# Patient Record
Sex: MALE | Race: WHITE | NOT HISPANIC OR LATINO | URBAN - METROPOLITAN AREA
[De-identification: names, ages, dates, MRNs, and addresses within clinical notes are randomized per-mention and may not be internally consistent; named-entity substitution may affect disease eponyms.]

---

## 2019-11-05 ENCOUNTER — INPATIENT (INPATIENT)
Facility: HOSPITAL | Age: 59
LOS: 1 days | Discharge: HOME | DRG: 728 | End: 2019-11-07
Attending: INTERNAL MEDICINE | Admitting: INTERNAL MEDICINE
Payer: COMMERCIAL

## 2019-11-05 VITALS — WEIGHT: 199.96 LBS

## 2019-11-05 DIAGNOSIS — N45.1 EPIDIDYMITIS: ICD-10-CM

## 2019-11-05 LAB
ALBUMIN SERPL ELPH-MCNC: 3.7 G/DL — SIGNIFICANT CHANGE UP (ref 3.3–5)
ALP SERPL-CCNC: 68 U/L — SIGNIFICANT CHANGE UP (ref 40–120)
ALT FLD-CCNC: 16 U/L — SIGNIFICANT CHANGE UP (ref 12–78)
ANION GAP SERPL CALC-SCNC: 2 MMOL/L — LOW (ref 5–17)
APPEARANCE UR: CLEAR — SIGNIFICANT CHANGE UP
APTT BLD: 30 SEC — SIGNIFICANT CHANGE UP (ref 27.5–36.3)
AST SERPL-CCNC: 13 U/L — LOW (ref 15–37)
BASOPHILS # BLD AUTO: 0.04 K/UL — SIGNIFICANT CHANGE UP (ref 0–0.2)
BASOPHILS NFR BLD AUTO: 0.5 % — SIGNIFICANT CHANGE UP (ref 0–2)
BILIRUB SERPL-MCNC: 0.9 MG/DL — SIGNIFICANT CHANGE UP (ref 0.2–1.2)
BILIRUB UR-MCNC: NEGATIVE — SIGNIFICANT CHANGE UP
BUN SERPL-MCNC: 16 MG/DL — SIGNIFICANT CHANGE UP (ref 7–23)
CALCIUM SERPL-MCNC: 8.7 MG/DL — SIGNIFICANT CHANGE UP (ref 8.5–10.1)
CHLORIDE SERPL-SCNC: 108 MMOL/L — SIGNIFICANT CHANGE UP (ref 96–108)
CO2 SERPL-SCNC: 29 MMOL/L — SIGNIFICANT CHANGE UP (ref 22–31)
COLOR SPEC: YELLOW — SIGNIFICANT CHANGE UP
CREAT SERPL-MCNC: 1.02 MG/DL — SIGNIFICANT CHANGE UP (ref 0.5–1.3)
DIFF PNL FLD: NEGATIVE — SIGNIFICANT CHANGE UP
EOSINOPHIL # BLD AUTO: 0.11 K/UL — SIGNIFICANT CHANGE UP (ref 0–0.5)
EOSINOPHIL NFR BLD AUTO: 1.4 % — SIGNIFICANT CHANGE UP (ref 0–6)
GLUCOSE SERPL-MCNC: 77 MG/DL — SIGNIFICANT CHANGE UP (ref 70–99)
GLUCOSE UR QL: NEGATIVE MG/DL — SIGNIFICANT CHANGE UP
HCT VFR BLD CALC: 42.3 % — SIGNIFICANT CHANGE UP (ref 39–50)
HGB BLD-MCNC: 14.9 G/DL — SIGNIFICANT CHANGE UP (ref 13–17)
IMM GRANULOCYTES NFR BLD AUTO: 0.4 % — SIGNIFICANT CHANGE UP (ref 0–1.5)
INR BLD: 1.05 RATIO — SIGNIFICANT CHANGE UP (ref 0.88–1.16)
KETONES UR-MCNC: NEGATIVE — SIGNIFICANT CHANGE UP
LEUKOCYTE ESTERASE UR-ACNC: NEGATIVE — SIGNIFICANT CHANGE UP
LIDOCAIN IGE QN: 71 U/L — LOW (ref 73–393)
LYMPHOCYTES # BLD AUTO: 1.5 K/UL — SIGNIFICANT CHANGE UP (ref 1–3.3)
LYMPHOCYTES # BLD AUTO: 18.5 % — SIGNIFICANT CHANGE UP (ref 13–44)
MCHC RBC-ENTMCNC: 31.9 PG — SIGNIFICANT CHANGE UP (ref 27–34)
MCHC RBC-ENTMCNC: 35.2 GM/DL — SIGNIFICANT CHANGE UP (ref 32–36)
MCV RBC AUTO: 90.6 FL — SIGNIFICANT CHANGE UP (ref 80–100)
MONOCYTES # BLD AUTO: 0.68 K/UL — SIGNIFICANT CHANGE UP (ref 0–0.9)
MONOCYTES NFR BLD AUTO: 8.4 % — SIGNIFICANT CHANGE UP (ref 2–14)
NEUTROPHILS # BLD AUTO: 5.77 K/UL — SIGNIFICANT CHANGE UP (ref 1.8–7.4)
NEUTROPHILS NFR BLD AUTO: 70.8 % — SIGNIFICANT CHANGE UP (ref 43–77)
NITRITE UR-MCNC: NEGATIVE — SIGNIFICANT CHANGE UP
PH UR: 5 — SIGNIFICANT CHANGE UP (ref 5–8)
PLATELET # BLD AUTO: 218 K/UL — SIGNIFICANT CHANGE UP (ref 150–400)
POTASSIUM SERPL-MCNC: 4.1 MMOL/L — SIGNIFICANT CHANGE UP (ref 3.5–5.3)
POTASSIUM SERPL-SCNC: 4.1 MMOL/L — SIGNIFICANT CHANGE UP (ref 3.5–5.3)
PROT SERPL-MCNC: 6.9 GM/DL — SIGNIFICANT CHANGE UP (ref 6–8.3)
PROT UR-MCNC: NEGATIVE MG/DL — SIGNIFICANT CHANGE UP
PROTHROM AB SERPL-ACNC: 11.7 SEC — SIGNIFICANT CHANGE UP (ref 10–12.9)
RBC # BLD: 4.67 M/UL — SIGNIFICANT CHANGE UP (ref 4.2–5.8)
RBC # FLD: 12.8 % — SIGNIFICANT CHANGE UP (ref 10.3–14.5)
SODIUM SERPL-SCNC: 139 MMOL/L — SIGNIFICANT CHANGE UP (ref 135–145)
SP GR SPEC: 1.01 — SIGNIFICANT CHANGE UP (ref 1.01–1.02)
UROBILINOGEN FLD QL: NEGATIVE MG/DL — SIGNIFICANT CHANGE UP
WBC # BLD: 8.13 K/UL — SIGNIFICANT CHANGE UP (ref 3.8–10.5)
WBC # FLD AUTO: 8.13 K/UL — SIGNIFICANT CHANGE UP (ref 3.8–10.5)

## 2019-11-05 PROCEDURE — 84100 ASSAY OF PHOSPHORUS: CPT

## 2019-11-05 PROCEDURE — 80053 COMPREHEN METABOLIC PANEL: CPT

## 2019-11-05 PROCEDURE — 86803 HEPATITIS C AB TEST: CPT

## 2019-11-05 PROCEDURE — 85025 COMPLETE CBC W/AUTO DIFF WBC: CPT

## 2019-11-05 PROCEDURE — 76870 US EXAM SCROTUM: CPT | Mod: 26

## 2019-11-05 PROCEDURE — 83735 ASSAY OF MAGNESIUM: CPT

## 2019-11-05 PROCEDURE — 74177 CT ABD & PELVIS W/CONTRAST: CPT | Mod: 26

## 2019-11-05 PROCEDURE — 36415 COLL VENOUS BLD VENIPUNCTURE: CPT

## 2019-11-05 PROCEDURE — A9579: CPT

## 2019-11-05 PROCEDURE — 72197 MRI PELVIS W/O & W/DYE: CPT

## 2019-11-05 RX ORDER — CIPROFLOXACIN LACTATE 400MG/40ML
1 VIAL (ML) INTRAVENOUS
Qty: 7 | Refills: 0
Start: 2019-11-05 | End: 2019-11-11

## 2019-11-05 RX ORDER — CIPROFLOXACIN LACTATE 400MG/40ML
1 VIAL (ML) INTRAVENOUS
Qty: 10 | Refills: 0
Start: 2019-11-05 | End: 2019-11-14

## 2019-11-05 RX ORDER — MAGNESIUM HYDROXIDE 400 MG/1
30 TABLET, CHEWABLE ORAL DAILY
Refills: 0 | Status: DISCONTINUED | OUTPATIENT
Start: 2019-11-05 | End: 2019-11-07

## 2019-11-05 RX ORDER — CEFTRIAXONE 500 MG/1
1 INJECTION, POWDER, FOR SOLUTION INTRAMUSCULAR; INTRAVENOUS EVERY 24 HOURS
Refills: 0 | Status: DISCONTINUED | OUTPATIENT
Start: 2019-11-05 | End: 2019-11-06

## 2019-11-05 RX ORDER — CHONDROITIN SULFATE A SODIUM 100 %
0 POWDER (GRAM) MISCELLANEOUS
Qty: 0 | Refills: 0 | DISCHARGE

## 2019-11-05 RX ORDER — DICLOFENAC SODIUM 75 MG/1
1 TABLET, DELAYED RELEASE ORAL
Qty: 0 | Refills: 0 | DISCHARGE

## 2019-11-05 RX ORDER — SODIUM CHLORIDE 9 MG/ML
1000 INJECTION INTRAMUSCULAR; INTRAVENOUS; SUBCUTANEOUS ONCE
Refills: 0 | Status: COMPLETED | OUTPATIENT
Start: 2019-11-05 | End: 2019-11-05

## 2019-11-05 RX ORDER — KETOROLAC TROMETHAMINE 30 MG/ML
15 SYRINGE (ML) INJECTION ONCE
Refills: 0 | Status: DISCONTINUED | OUTPATIENT
Start: 2019-11-05 | End: 2019-11-05

## 2019-11-05 RX ORDER — SODIUM CHLORIDE 9 MG/ML
1000 INJECTION INTRAMUSCULAR; INTRAVENOUS; SUBCUTANEOUS
Refills: 0 | Status: DISCONTINUED | OUTPATIENT
Start: 2019-11-05 | End: 2019-11-06

## 2019-11-05 RX ORDER — MORPHINE SULFATE 50 MG/1
2 CAPSULE, EXTENDED RELEASE ORAL EVERY 6 HOURS
Refills: 0 | Status: DISCONTINUED | OUTPATIENT
Start: 2019-11-05 | End: 2019-11-07

## 2019-11-05 RX ORDER — KETOROLAC TROMETHAMINE 30 MG/ML
30 SYRINGE (ML) INJECTION ONCE
Refills: 0 | Status: DISCONTINUED | OUTPATIENT
Start: 2019-11-05 | End: 2019-11-05

## 2019-11-05 RX ADMIN — Medication 30 MILLIGRAM(S): at 14:56

## 2019-11-05 RX ADMIN — SODIUM CHLORIDE 100 MILLILITER(S): 9 INJECTION INTRAMUSCULAR; INTRAVENOUS; SUBCUTANEOUS at 21:40

## 2019-11-05 RX ADMIN — CEFTRIAXONE 1 MILLIGRAM(S): 500 INJECTION, POWDER, FOR SOLUTION INTRAMUSCULAR; INTRAVENOUS at 21:44

## 2019-11-05 RX ADMIN — Medication 30 MILLIGRAM(S): at 12:29

## 2019-11-05 RX ADMIN — SODIUM CHLORIDE 1000 MILLILITER(S): 9 INJECTION INTRAMUSCULAR; INTRAVENOUS; SUBCUTANEOUS at 11:28

## 2019-11-05 RX ADMIN — SODIUM CHLORIDE 1000 MILLILITER(S): 9 INJECTION INTRAMUSCULAR; INTRAVENOUS; SUBCUTANEOUS at 14:56

## 2019-11-05 NOTE — H&P ADULT - GENITOURINARY MALE
no ulcer/Examination done with HUI Buenrostro chaperoning in room/normal external genitalia/no discharge/no tenderness

## 2019-11-05 NOTE — ED STATDOCS - PATIENT PORTAL LINK FT
You can access the FollowMyHealth Patient Portal offered by [facility] by registering at the following website: [email or web address]. By joining [employer]s FollowMyHealth portal, you will also be able to view your health information using other applications (apps) compatible with our system.

## 2019-11-05 NOTE — ED STATDOCS - CARE PROVIDER_API CALL
Espinoza Damon)  ColonRectal Surgery; Surgery  [address] [place] [address]  Phone: [phone]  Fax: [phone]  Follow Up Time:     Lazaro Feldman)  Urology  [address] [facility], [address]nd [place] [address]  Phone: [phone]  Fax: [phone]  Follow Up Time:

## 2019-11-05 NOTE — PHARMACOTHERAPY INTERVENTION NOTE - COMMENTS
Med rec complete. Checked med hx with DrFirst. Confirmed meds with patient at bedside. Patient states he does not take anything other than diclofenac when needed for his back pain.

## 2019-11-05 NOTE — CONSULT NOTE ADULT - ASSESSMENT
Assessment : Epididymitis    Plan : Consider starting Bactrim. Toradol for pain control. F/u outpatient in 1-2 weeks.  Epididymis doesn't explain patient's ambulation difficulties. Likely musculoskeletal in nature Assessment : Epididymitis    Plan : PO Antibiotics and Toradol.  F/u outpatient in 1-2 weeks.  Epididymis doesn't explain patient's ambulation difficulties. Likely neurological/musculoskeletal in nature consider Sports medicine/Neurology   consult.

## 2019-11-05 NOTE — ED STATDOCS - CLINICAL SUMMARY MEDICAL DECISION MAKING FREE TEXT BOX
Pt with peroneal pain. Will check labs. CT. Pt with peroneal pain. Will check labs. CT. Labs & CT unremarkable. US + for epididymitis. Patient made aware, will follow up with urology and colorectal surgery.

## 2019-11-05 NOTE — ED STATDOCS - SKIN, MLM
skin normal color for race, warm, dry and intact. skin normal color for race, warm, dry and intact. Mild peroneal tenderness. No erythema, No fluctuance.

## 2019-11-05 NOTE — ED STATDOCS - ATTENDING CONTRIBUTION TO CARE
I, Russ Escobedo, performed the initial face to face bedside interview with this patient regarding history of present illness, review of symptoms and relevant past medical, social and family history.  I completed an independent physical examination.  I was the initial provider who evaluated this patient. I have signed out the follow up of any pending tests (i.e. labs, radiological studies) to the ACP.  I have communicated the patient’s plan of care and disposition with the ACP.  The history, relevant review of systems, past medical and surgical history, medical decision making, and physical examination was documented by the scribe in my presence and I attest to the accuracy of the documentation.

## 2019-11-05 NOTE — ED ADULT NURSE REASSESSMENT NOTE - NS ED NURSE REASSESS COMMENT FT1
patient refused morphine, and patient is upset about not getting an MRI done in the hospital. Attempted to talk to patient about the tests that were being performed and controlling his pain and making him comfortable while he was here. Pt stated that "he didn't want the pain to be controlled, he just wants the problem to be solved"

## 2019-11-05 NOTE — H&P ADULT - ASSESSMENT
60 y/o M with PMH of bartonella infection 3 years ago, p/w perineal /groin pain    *Epididimytis  -Ceftriaxone   -F/u cultures    *Perineal / Groin / lower abdominal pain  -MRI of pelvic area and lumbar spine for further evaluation  -Pain control    *2.1cm indeterminate renal lesion  -F/u MRI result  -F/u  recommendations     *DVT ppx  -SCDs 60 y/o M with PMH of bartonella infection 3 years ago, p/w perineal /groin pain    *Epididimytis  -Ceftriaxone   -UA negative     *Perineal / Groin / lower abdominal pain  -MRI of pelvic area and lumbar spine for further evaluation  -Pain control    *2.1cm indeterminate renal lesion  -F/u MRI result  -F/u  recommendations     *DVT ppx  -SCDs

## 2019-11-05 NOTE — CONSULT NOTE ADULT - SUBJECTIVE AND OBJECTIVE BOX
60 yo M presents with complaints of groin pain x 2 months that has been progressively getting worse. His reports his pain is mainly in perineal area. Denies any trauma. States yesterday his pain flared and is currently causing him to have difficulty in ambulating. Pt reports [place] other weekend. Pt denies any fever/chills, hematuria, dysuria, frequency, urgency, flank pain or scrotal pain.   CT     PAST MEDICAL & SURGICAL HISTORY: none      REVIEW OF SYSTEMS:    CONSTITUTIONAL:  fevers or chills  HEENT: No visual changes  ENDO: No sweating  NECK: No pain or stiffness  MUSCULOSKELETAL: No back pain, no joint pain  RESPIRATORY: No shortness of breath  CARDIOVASCULAR: No chest pain  GASTROINTESTINAL: No abdominal pain, nausea or vomiting  : in HPI  NEUROLOGICAL: No mental status changes    Allergies  hydrocortisone (Unknown)    SOCIAL HISTORY: No illicit drug use // smoking  : [ ] yes [ ] no //  : [ ] yes [ ] no // ETOH :  [ ] yes [ ] no    Vital Signs Last 24 Hrs  T(C): 36.8 (2019 14:50), Max: 36.8 (2019 14:50)  T(F): 98.3 (2019 14:50), Max: 98.3 (2019 14:50)  HR: 69 (2019 14:50) (69 - 70)  BP: 120/72 (2019 14:50) (112/76 - 120/72)  BP(mean): --  RR: 18 (2019 14:50) (18 - 18)  SpO2: 97% (2019 14:50) (97% - 100%)   [ ] yes [ ] no  PHYSICAL EXAM:    Constitutional: NAD, well-developed  Neck: no pain  Respiratory:  No accessory respiratory muscle use  CV: Chest symmetric, equal expansion  Back: No CVA tenderness  Abd: Soft, NT/ND  no hernia appreciated  : no scrotal swelling. No testicular tenderness. No tenderness of epididymis   BLANCA: prostate not tender to palpation. smooth in texture  Extremities: no edema  Neurological: A/O x 3  Psychiatric: Normal mood, normal affect  Skin: No rashes    LABS:                        14.9   8.13  )-----------( 218      ( 2019 11:00 )             42.3     11-    139  |  108  |  16  ----------------------------<  77  4.1   |  29  |  1.02    Ca    8.7      2019 11:00    TPro  6.9  /  Alb  3.7  /  TBili  0.9  /  DBili  x   /  AST  13<L>  /  ALT  16  /  AlkPhos  68  11-05    PT/INR - ( 2019 11:00 )   PT: 11.7 sec;   INR: 1.05 ratio       PTT - ( 2019 11:00 )  PTT:30.0 sec  Urinalysis Basic - ( 2019 11:24 )    Color: Yellow / Appearance: Clear / S.010 / pH: x  Gluc: x / Ketone: Negative  / Bili: Negative / Urobili: Negative mg/dL   Blood: x / Protein: Negative mg/dL / Nitrite: Negative   Leuk Esterase: Negative / RBC: x / WBC x   Sq Epi: x / Non Sq Epi: x / Bacteria    RADIOLOGY & ADDITIONAL STUDIES:\    CT: No evidence of perianal or perineal inflammation or abscess.    Scrotal ultrasound: Findings are suggestive of epididymitis in the left scrotum. A cyst in   the left epididymal head.  The testes are unremarkable. Small bilateral hydroceles. 60 yo M presents with complaints of groin pain x 2 months that has been progressively getting worse. His reports his pain is mainly in perineal area. Denies any trauma. States yesterday his pain flared and is currently causing him to have difficulty in ambulating. Pt reports [place] other weekend. Pt denies any fever/chills, hematuria, dysuria, frequency, urgency, flank pain or scrotal pain.       PAST MEDICAL & SURGICAL HISTORY: none      REVIEW OF SYSTEMS:    CONSTITUTIONAL:  fevers or chills  HEENT: No visual changes  ENDO: No sweating  NECK: No pain or stiffness  MUSCULOSKELETAL: No back pain, no joint pain  RESPIRATORY: No shortness of breath  CARDIOVASCULAR: No chest pain  GASTROINTESTINAL: No abdominal pain, nausea or vomiting  : in HPI  NEUROLOGICAL: No mental status changes    Allergies  hydrocortisone (Unknown)    SOCIAL HISTORY: No illicit drug use // smoking  : [ ] yes [ ] no //  : [ ] yes [ ] no // ETOH :  [ ] yes [ ] no    Vital Signs Last 24 Hrs  T(C): 36.8 (2019 14:50), Max: 36.8 (2019 14:50)  T(F): 98.3 (2019 14:50), Max: 98.3 (2019 14:50)  HR: 69 (2019 14:50) (69 - 70)  BP: 120/72 (2019 14:50) (112/76 - 120/72)  BP(mean): --  RR: 18 (2019 14:50) (18 - 18)  SpO2: 97% (2019 14:50) (97% - 100%)   [ ] yes [ ] no  PHYSICAL EXAM:    Constitutional: NAD, well-developed  Neck: no pain  Respiratory:  No accessory respiratory muscle use  CV: Chest symmetric, equal expansion  Back: No CVA tenderness  Abd: Soft, NT/ND  no hernia appreciated  : no scrotal swelling. No testicular tenderness. No tenderness of epididymis   BLANCA: Prostate- 30g, non tender, no nodules  Extremities: no edema  Neurological: A/O x 3  Psychiatric: Normal mood, normal affect  Skin: No rashes    LABS:                        14.9   8.13  )-----------( 218      ( 2019 11:00 )             42.3     11-    139  |  108  |  16  ----------------------------<  77  4.1   |  29  |  1.02    Ca    8.7      2019 11:00    TPro  6.9  /  Alb  3.7  /  TBili  0.9  /  DBili  x   /  AST  13<L>  /  ALT  16  /  AlkPhos  68  11-05    PT/INR - ( 2019 11:00 )   PT: 11.7 sec;   INR: 1.05 ratio       PTT - ( 2019 11:00 )  PTT:30.0 sec  Urinalysis Basic - ( 2019 11:24 )    Color: Yellow / Appearance: Clear / S.010 / pH: x  Gluc: x / Ketone: Negative  / Bili: Negative / Urobili: Negative mg/dL   Blood: x / Protein: Negative mg/dL / Nitrite: Negative   Leuk Esterase: Negative / RBC: x / WBC x   Sq Epi: x / Non Sq Epi: x / Bacteria    RADIOLOGY & ADDITIONAL STUDIES:\    CT: No evidence of perianal or perineal inflammation or abscess.    Scrotal ultrasound: Findings are suggestive of epididymitis in the left scrotum. A cyst in   the left epididymal head.  The testes are unremarkable. Small bilateral hydroceles.

## 2019-11-05 NOTE — ED STATDOCS - NSFOLLOWUPINSTRUCTIONS_ED_ALL_ED_FT
Epididymitis    WHAT YOU NEED TO KNOW:    What is epididymitis? Epididymitis is inflammation of your epididymis. The epididymis is a coiled tube inside your scrotum. It stores and carries sperm from your testicles to your penis. Acute epididymitis lasts for 6 weeks or less. Chronic epididymitis lasts longer than 6 weeks.Male Reproductive System         What causes epididymitis? The cause of epididymitis may be unknown. It may be caused by any of the following:     A urinary tract infection (UTI) that spreads to the epididymis       Urine that flows backward from your urethra to the epididymitis      Use of heart medicine called amiodarone      Sexually transmitted infections (STIs) such as gonorrhea or Chlamydia    What increases my risk of epididymitis?     Urinary tract conditions that cause frequent UTIs      Having an indwelling urinary catheter (thin, flexible tube inserted into the bladder and left in place to drain urine)      Recent surgery of the urinary tract      Physical strain that puts pressure on the abdomen, such as heavy lifting      Prostate disorders such as benign prostatic hypertrophy or prostatitis    What are the signs and symptoms of epididymitis?     Pain or tenderness in your scrotum, abdomen, or groin      Redness or swelling of your scrotum      Pain or burning during urination, or frequent urination      Discharge from your penis or blood in your urine or semen      Fever    How is epididymitis diagnosed? Your healthcare provider may examine your penis, prostate, and scrotum. He may ask about your symptoms and any health conditions you have. You may need any of the following tests:     Blood and urine tests may be done to see if you have an infection. If you have discharge, a small amount of this fluid will be tested for bacteria.       An ultrasound uses sound waves to show pictures of your testicles on a monitor. An ultrasound may be used to check blood flow to your testicles.       A nuclear scan checks the blood flow in your testicles. A small amount of radioactive material may be injected into your blood. The radioactive material helps your blood vessels show up better.    How is epididymitis treated? Your treatment depends on the cause of your epididymitis and may include any of the following:     Antibiotics may be given if epididymitis is caused by a bacterial infection.       NSAIDs, such as ibuprofen, help decrease swelling, pain, and fever. This medicine is available with or without a doctor's order. NSAIDs can cause stomach bleeding or kidney problems in certain people. If you take blood thinner medicine, always ask if NSAIDs are safe for you. Always read the medicine label and follow directions. Do not give these medicines to children under 6 months of age without direction from your child's healthcare provider.      Acetaminophen decreases pain and fever. It is available without a doctor's order. Ask how much to take and how often to take it. Follow directions. Acetaminophen can cause liver damage if not taken correctly.      Prescription pain medicine may be given. Ask how to take this medicine safely.      Surgery may be needed if your condition gets worse or becomes chronic. Surgery to drain an abscess (collection of pus) may be needed. Surgery to remove part or all of your epididymis or testicle may also be done.     How can I manage epididymitis?     Apply ice on your testicles for 15 to 20 minutes every hour or as directed. Use an ice pack, or put crushed ice in a plastic bag. Cover it with a towel. Ice helps prevent tissue damage and decreases swelling and pain.      Rest in bed as directed. Elevate your scrotum when you sit or lie down to help reduce swelling and pain. You may be asked to do this by placing a rolled-up towel under your scrotum.      Scrotal support may be recommended. An athletic supporter provides scrotal support and may make you more comfortable when you stand. Ask your healthcare provider how to use an athletic supporter.       Do not lift heavy objects. You can make swelling worse if you lift heavy objects or strain.     When should I seek immediate care?     You have severe pain in your testicles.      Your symptoms become worse even after you start treatment with medicine.    When should I contact my healthcare provider?     Your symptoms do not get better within 3 days of treatment or come back after treatment.      You have a hot, red, tender area on your testicles.      You have questions or concerns about your condition or care.    CARE AGREEMENT:    You have the right to help plan your care. Learn about your health condition and how it may be treated. Discuss treatment options with your healthcare providers to decide what care you want to receive. You always have the right to refuse treatment.        © Copyright [employer] 2019 All illustrations and images included in CareNotes are the copyrighted property of [email or web address] or [employer].      back to top                      © Copyright [employer] 2019

## 2019-11-05 NOTE — ED STATDOCS - CARE PROVIDERS DIRECT ADDRESSES
[email or web address]

## 2019-11-05 NOTE — ED STATDOCS - CARE PLAN
Principal Discharge DX:	Epididymitis Principal Discharge DX:	Epididymitis  Secondary Diagnosis:	Intractable pain  Secondary Diagnosis:	Ambulatory dysfunction

## 2019-11-05 NOTE — ED ADULT TRIAGE NOTE - CHIEF COMPLAINT QUOTE
pt c/o groin pain but unable to specify if the left or right  groin hurts. pt states there is no buldge and the o pain is in the center, causing him to not be able to  walk or eat. pt states he has a strangulated hernia - pt states this was not confirmed by a doctor, pt denies fever and urianry symptoms

## 2019-11-05 NOTE — ED ADULT NURSE REASSESSMENT NOTE - NS ED NURSE REASSESS COMMENT FT1
patient wants to leave [facility], spoke to md rachel, orders for MRI will be placed per virginie, patient decided to stay in hospital, report given to 5E

## 2019-11-05 NOTE — ED STATDOCS - OBJECTIVE STATEMENT
58 y/o male with a PMHx of hemorrhoids presents to the ED for evaluation. Pt notes he has a hernia that has not been confirmed by a doctor. Pt states there is no bulge in area. Pt c/o worsening pain. Last BM yesterday. No other complaints at this time. 58 y/o male with a PMHx of hemorrhoids presents to the ED for evaluation. Pt reports a hernia between his rectum and scrotum. It has not been confirmed by a provider, but this diagnosis is based on the patient's research. Pt states there is no bulge in area. Pt c/o worsening pain. Last BM yesterday. No other complaints at this time. he saw a doctor in [place] recently.

## 2019-11-05 NOTE — ED STATDOCS - PROGRESS NOTE DETAILS
58 y/o M with PMH of hemorrhoids presents with perineal/groin pain. Pt believes he has a hernia. Denies fever, chills, dysuria, difficulty with bowel movement. Pt states pain is so severe he has difficulty walking. PE: Uncomfortable appearing. Cardiac: s1s2, RRR> Lungs: CTAB. Abdomen: NBS x4, soft, nontender. No CVAT. : Performed with Dr. Escobedo. No significant erythema, edema in perineal area. No palpable mass/tenderness to palpation. A/P: Perineal pain, plan for analgesia, labs, CTAP, reassess. - Yury Isaac PA-C Had at length discussion with patient. Labs and CT unremarkable. Pt reported persistent pain, concerned CT scan not functioning properly in absence of lack of findings. At that time noted pain radiated into testicles. Patient sent for US, findings consistent with epididymitis. Patient made aware. Does not believe epididymitis is cause of pain. Patient made aware that lack of findings in ED does not correlate with lack of pathology. Patient advised to pursue further follow up with urology and colorectal surgery. Patient is agreeable to plan, will d/c at this time. Offered percocet at home for pain control, patient refused. Advised ibuprofen until follow up pursued. - Yury Isaac PA-C Patient refusing to leave following discharge, demanding urology consult. Will call urology to discuss. Pt reports persistent pain with walking. - Yury Isaac PA-C Dr. Brizuela to see patient in ED. - Yury Isaac PA-C Patient evaluated by urology, agreed with PO antibiotics and analgesia for epididymitis. Does not feel pain patient reports if from urologic source. States patient is demanding sports medicine consult and neurology consult. - Yury Isaac PA-C Patient re-evaluated, states he still is unable to walk 2/2 pain. Offered percocet, refused. Patient offered admission for further evaluation including consultation and imaging. Patient is agreeable. Plan for admission. - Yury Isaac PA-C

## 2019-11-06 LAB
ALBUMIN SERPL ELPH-MCNC: 3.4 G/DL — SIGNIFICANT CHANGE UP (ref 3.3–5)
ALP SERPL-CCNC: 58 U/L — SIGNIFICANT CHANGE UP (ref 40–120)
ALT FLD-CCNC: 15 U/L — SIGNIFICANT CHANGE UP (ref 12–78)
ANION GAP SERPL CALC-SCNC: 6 MMOL/L — SIGNIFICANT CHANGE UP (ref 5–17)
AST SERPL-CCNC: 11 U/L — LOW (ref 15–37)
BASOPHILS # BLD AUTO: 0.03 K/UL — SIGNIFICANT CHANGE UP (ref 0–0.2)
BASOPHILS NFR BLD AUTO: 0.5 % — SIGNIFICANT CHANGE UP (ref 0–2)
BILIRUB SERPL-MCNC: 0.6 MG/DL — SIGNIFICANT CHANGE UP (ref 0.2–1.2)
BUN SERPL-MCNC: 16 MG/DL — SIGNIFICANT CHANGE UP (ref 7–23)
CALCIUM SERPL-MCNC: 8.2 MG/DL — LOW (ref 8.5–10.1)
CHLORIDE SERPL-SCNC: 109 MMOL/L — HIGH (ref 96–108)
CO2 SERPL-SCNC: 26 MMOL/L — SIGNIFICANT CHANGE UP (ref 22–31)
CREAT SERPL-MCNC: 0.95 MG/DL — SIGNIFICANT CHANGE UP (ref 0.5–1.3)
CULTURE RESULTS: NO GROWTH — SIGNIFICANT CHANGE UP
EOSINOPHIL # BLD AUTO: 0.06 K/UL — SIGNIFICANT CHANGE UP (ref 0–0.5)
EOSINOPHIL NFR BLD AUTO: 0.9 % — SIGNIFICANT CHANGE UP (ref 0–6)
GLUCOSE SERPL-MCNC: 87 MG/DL — SIGNIFICANT CHANGE UP (ref 70–99)
HCT VFR BLD CALC: 41.6 % — SIGNIFICANT CHANGE UP (ref 39–50)
HCV AB S/CO SERPL IA: 0.12 S/CO — SIGNIFICANT CHANGE UP (ref 0–0.99)
HCV AB SERPL-IMP: SIGNIFICANT CHANGE UP
HGB BLD-MCNC: 14 G/DL — SIGNIFICANT CHANGE UP (ref 13–17)
IMM GRANULOCYTES NFR BLD AUTO: 0.3 % — SIGNIFICANT CHANGE UP (ref 0–1.5)
LYMPHOCYTES # BLD AUTO: 1.35 K/UL — SIGNIFICANT CHANGE UP (ref 1–3.3)
LYMPHOCYTES # BLD AUTO: 20.5 % — SIGNIFICANT CHANGE UP (ref 13–44)
MAGNESIUM SERPL-MCNC: 2 MG/DL — SIGNIFICANT CHANGE UP (ref 1.6–2.6)
MCHC RBC-ENTMCNC: 30.8 PG — SIGNIFICANT CHANGE UP (ref 27–34)
MCHC RBC-ENTMCNC: 33.7 GM/DL — SIGNIFICANT CHANGE UP (ref 32–36)
MCV RBC AUTO: 91.6 FL — SIGNIFICANT CHANGE UP (ref 80–100)
MONOCYTES # BLD AUTO: 0.5 K/UL — SIGNIFICANT CHANGE UP (ref 0–0.9)
MONOCYTES NFR BLD AUTO: 7.6 % — SIGNIFICANT CHANGE UP (ref 2–14)
NEUTROPHILS # BLD AUTO: 4.61 K/UL — SIGNIFICANT CHANGE UP (ref 1.8–7.4)
NEUTROPHILS NFR BLD AUTO: 70.2 % — SIGNIFICANT CHANGE UP (ref 43–77)
PHOSPHATE SERPL-MCNC: 2.6 MG/DL — SIGNIFICANT CHANGE UP (ref 2.5–4.5)
PLATELET # BLD AUTO: 218 K/UL — SIGNIFICANT CHANGE UP (ref 150–400)
POTASSIUM SERPL-MCNC: 4.2 MMOL/L — SIGNIFICANT CHANGE UP (ref 3.5–5.3)
POTASSIUM SERPL-SCNC: 4.2 MMOL/L — SIGNIFICANT CHANGE UP (ref 3.5–5.3)
PROT SERPL-MCNC: 6.4 GM/DL — SIGNIFICANT CHANGE UP (ref 6–8.3)
RBC # BLD: 4.54 M/UL — SIGNIFICANT CHANGE UP (ref 4.2–5.8)
RBC # FLD: 12.6 % — SIGNIFICANT CHANGE UP (ref 10.3–14.5)
SODIUM SERPL-SCNC: 141 MMOL/L — SIGNIFICANT CHANGE UP (ref 135–145)
SPECIMEN SOURCE: SIGNIFICANT CHANGE UP
WBC # BLD: 6.57 K/UL — SIGNIFICANT CHANGE UP (ref 3.8–10.5)
WBC # FLD AUTO: 6.57 K/UL — SIGNIFICANT CHANGE UP (ref 3.8–10.5)

## 2019-11-06 PROCEDURE — 72197 MRI PELVIS W/O & W/DYE: CPT | Mod: 26

## 2019-11-06 RX ORDER — CEFTRIAXONE 500 MG/1
1000 INJECTION, POWDER, FOR SOLUTION INTRAMUSCULAR; INTRAVENOUS EVERY 24 HOURS
Refills: 0 | Status: DISCONTINUED | OUTPATIENT
Start: 2019-11-06 | End: 2019-11-07

## 2019-11-06 RX ADMIN — CEFTRIAXONE 1000 MILLIGRAM(S): 500 INJECTION, POWDER, FOR SOLUTION INTRAMUSCULAR; INTRAVENOUS at 22:17

## 2019-11-06 RX ADMIN — SODIUM CHLORIDE 100 MILLILITER(S): 9 INJECTION INTRAMUSCULAR; INTRAVENOUS; SUBCUTANEOUS at 06:15

## 2019-11-06 RX ADMIN — MAGNESIUM HYDROXIDE 30 MILLILITER(S): 400 TABLET, CHEWABLE ORAL at 09:00

## 2019-11-06 NOTE — PROGRESS NOTE ADULT - SUBJECTIVE AND OBJECTIVE BOX
60 y/o M with PMH of bartonella infection 3 years ago, p/w groin pain. Patient states he has had groin pain for about two months, but is mainly pointing at the perineum area. Also states he has some pain in inguinal areas and lower abdomen. Yesterday afternoon pain became so severe that patient was not able to walk, he had to crawl on the floor inside his house. He wasn't even able to make it to bed, he ended up sleeping on the couch. Normally he is a very active person, and now he is barely able to do anything. Patient states multiple times that pain is not coming from testicles, and does not believe that this could any way be referred pain from epididymitis seen on U/S. Patient is demanding further evaluation with MRI, as he states he is in severe pain that hasn't been explained with CT scan or U/S, and it is causing difficulty with ambulation. Patient denies fever, chills, dysuria, nausea, vomiting, CP, cough, runny nose, sore throat. Patient denies any weakness in the lower extremities, and states difficulty walking is purely due to the pain that he is feeling. Denies any sensory deficits / saddle anesthesia. Denies back pain, incontinence.     19: Patient seen and examined. NO new complaints. Waiting for MRI.         Vital Signs Last 24 Hrs  T(C): 37.1 (2019 11:36), Max: 37.1 (2019 11:36)  T(F): 98.8 (2019 11:36), Max: 98.8 (2019 11:36)  HR: 70 (2019 11:36) (70 - 74)  BP: 119/77 (2019 11:36) (115/70 - 140/78)  BP(mean): --  RR: 16 (2019 11:36) (16 - 19)  SpO2: 97% (2019 11:36) (97% - 99%)      Physical Exam:  · Constitutional	Well-developed, well nourished	  · Eyes	EOMI; PERRL; no drainage or redness	  · ENMT	No oral lesions; no gross abnormalities	  · Neck	No bruits; no thyromegaly or nodules	  · Respiratory	Breath Sounds equal & clear to percussion & auscultation, no accessory muscle use	  · Cardiovascular	Regular rate & rhythm, normal S1, S2; no murmurs, gallops or rubs; no S3, S4	  · Gastrointestinal	Soft, non-tender, no hepatosplenomegaly, normal bowel sounds	  · Genitourinary	detailed exam	  · Genitourinary Details Male	normal external genitalia; no discharge; no tenderness; no ulcer; Examination done with HUI Buenrostro chaperoning in room	  · Extremities	No cyanosis, clubbing or edema	  · Neurological	Alert & oriented; no sensory, motor or coordination deficits, normal reflexes	  · Musculoskeletal	No joint pain, swelling or deformity; no limitation of movement	            Labs:                           14.0   6.57  )-----------( 218      ( 2019 06:53 )             41.6     2019 06:53    141    |  109    |  16     ----------------------------<  87     4.2     |  26     |  0.95     Ca    8.2        2019 06:53  Phos  2.6       2019 06:53  Mg     2.0       2019 06:53    TPro  6.4    /  Alb  3.4    /  TBili  0.6    /  DBili  x      /  AST  11     /  ALT  15     /  AlkPhos  58     2019 06:53    LIVER FUNCTIONS - ( 2019 06:53 )  Alb: 3.4 g/dL / Pro: 6.4 gm/dL / ALK PHOS: 58 U/L / ALT: 15 U/L / AST: 11 U/L / GGT: x           PT/INR - ( 2019 11:00 )   PT: 11.7 sec;   INR: 1.05 ratio         PTT - ( 2019 11:00 )  PTT:30.0 sec  CAPILLARY BLOOD GLUCOSE            Urinalysis Basic - ( 2019 11:24 )    Color: Yellow / Appearance: Clear / S.010 / pH: x  Gluc: x / Ketone: Negative  / Bili: Negative / Urobili: Negative mg/dL   Blood: x / Protein: Negative mg/dL / Nitrite: Negative   Leuk Esterase: Negative / RBC: x / WBC x   Sq Epi: x / Non Sq Epi: x / Bacteria: x          MEDICATIONS  (STANDING):  cefTRIAXone Injectable. 1 milliGRAM(s) IV Push every 24 hours    MEDICATIONS  (PRN):  magnesium hydroxide Suspension 30 milliLiter(s) Oral daily PRN Constipation  morphine  - Injectable 2 milliGRAM(s) IV Push every 6 hours PRN Moderate Pain (4 - 6)          Assessment and Plan:   Assessment:  · Assessment		    60 y/o M with PMH of bartonella infection 3 years ago, p/w perineal /groin pain    *Epididymitis   -Continue IV Ceftriaxone   -UA negative  -ID and  eval     *Perineal / Groin / lower abdominal pain  -MRI of pelvic area and lumbar spine for further evaluation-pending  -Pain control    *2.1cm indeterminate renal lesion  -F/u MRI result  -F/u  recommendations     *DVT ppx  -SCDs

## 2019-11-07 VITALS
TEMPERATURE: 98 F | DIASTOLIC BLOOD PRESSURE: 71 MMHG | RESPIRATION RATE: 17 BRPM | HEART RATE: 70 BPM | SYSTOLIC BLOOD PRESSURE: 113 MMHG | OXYGEN SATURATION: 96 %

## 2019-11-07 PROCEDURE — 99222 1ST HOSP IP/OBS MODERATE 55: CPT

## 2019-11-07 NOTE — DISCHARGE NOTE NURSING/CASE MANAGEMENT/SOCIAL WORK - PATIENT PORTAL LINK FT
You can access the FollowMyHealth Patient Portal offered by [facility] by registering at the following website: [email or web address]. By joining [employer]’s FollowMyHealth portal, you will also be able to view your health information using other applications (apps) compatible with our system.

## 2019-11-07 NOTE — CONSULT NOTE ADULT - SUBJECTIVE AND OBJECTIVE BOX
59 year old male who presented to the hospital with severe perineal pain which prevented him from walking. He reports a sharp pain in the perineum with occasional radiation to the lower abdomen. It began 3 days ago and has gradually subsided. It is associated with changes in bowel movements as he did not have once since onset of pain which is different from his baseline of daily bowel movements. This is his fourth episode of this type of pain although it was more severe this time. All episodes have resolved spontaneously. Pain meds tend to help a bit and walking and certain positions make it worse. He denies fevers, chills, rectal bleeding, perirectal drainage or abscess or urinary symptoms. Last colonoscopy was within the past 2 years and he has a history of polyps.     PAST MEDICAL & SURGICAL HISTORY:  Hemorrhoids  Bartonella infection    Home Medications:  chondroitin:  (05 Nov 2019 20:48)  diclofenac sodium 75 mg oral delayed release tablet: 1 tab(s) orally prn (05 Nov 2019 20:48)    FAMILY HISTORY:  colon cancer in father     Social history: , non smoker    Exam:  Vital Signs Last 24 Hrs  T(C): 36.7 (07 Nov 2019 05:14), Max: 36.7 (06 Nov 2019 16:29)  T(F): 98 (07 Nov 2019 05:14), Max: 98 (06 Nov 2019 16:29)  HR: 70 (07 Nov 2019 05:14) (70 - 81)  BP: 113/71 (07 Nov 2019 05:14) (113/71 - 115/76)  RR: 17 (07 Nov 2019 05:14) (17 - 18)  SpO2: 96% (07 Nov 2019 05:14) (96% - 99%)    In no distress  Non labored breathing  Regular rate and rhythm  Abdomen soft, non tender, non distended  Rectum: no perianal inflammation, abscess or evidence of fistula. Non thrombosed external hemorrhoid, normal BLANCA. No mass appreciated  Skin is warm and dry  Moves extremities without difficulty  Alert and oriented x 3                          14.0   6.57  )-----------( 218      ( 06 Nov 2019 06:53 )             41.6   11-06    141  |  109<H>  |  16  ----------------------------<  87  4.2   |  26  |  0.95    Ca    8.2<L>      06 Nov 2019 06:53  Phos  2.6     11-06  Mg     2.0     11-06    TPro  6.4  /  Alb  3.4  /  TBili  0.6  /  DBili  x   /  AST  11<L>  /  ALT  15  /  AlkPhos  58  11-06    < from: MR Pelvis w/wo IV Cont (11.06.19 @ 13:19) >  EXAM:  MR PELVIS WAW IC                            PROCEDURE DATE:  11/06/2019          INTERPRETATION:  MR PELVIS WITHOUT AND WITH IV CONTRAST    CLINICAL INFORMATION: Pelvic and perineal pain     TECHNIQUE: Multiplanar T1-weighted, T2-weighted, and diffusion weighted   sequences were obtained of the pelvis, including dynamic post-contrast   T1-weighted sequences.  Dedicated, small field-of-view, thin-section   imaging is performed for evaluation of the perianal region.    Field Strength: 1.5T      < end of copied text >  < from: MR Pelvis w/wo IV Cont (11.06.19 @ 13:19) >  EXAM:  MR PELVIS WAW IC                          PROCEDURE DATE:  11/06/2019      INTERPRETATION:  MR PELVIS WITHOUT AND WITH IV CONTRAST    CLINICAL INFORMATION: Pelvic and perineal pain     COMPARISON: CT abdomen and pelvis 11/5/2019.    FINDINGS:    PERIANAL REGION: There is a linear enhancing tract arising from the right   posterior anus and terminating along the subcutaneous tissues of the   right posterior perianal region. No definite skin opening, although   correlation with direct visualization is needed. There are no secondary   tracts or abscesses. There is no significant fluid within the tract,   suggesting that this may be an inactive tract in the stages of healing   with granulation tissue formation.    PELVIC VISCERA: Prostate gland is within normal limits for size. Seminal   vesicles are symmetric. Unremarkable urinary bladder.   PELVIC LYMPH NODES: No pelvic adenopathy.   VASCULATURE: Normal caliber.  BOWEL: No abnormality in the visualized pelvic bowel loops.  PERITONEUM/ABDOMINAL WALL: No pelvic ascites.  SKELETAL: No aggressive lesion.     IMPRESSION:    There is a linear enhancing tract arising from the right posterior anus   and terminating along the subcutaneous tissues of the right posterior   perianal region. No definite skin opening, although correlation with   direct visualization is needed. There are no secondary tracts or   abscesses. There is no significant fluidwithin the tract, suggesting   that this may be an inactive tract in the stages of healing with   granulation tissue formation.       < end of copied text >    < from: CT Abdomen and Pelvis w/ IV Cont (11.05.19 @ 11:11) >    EXAM:  CT ABDOMEN AND PELVIS IC                            PROCEDURE DATE:  11/05/2019          INTERPRETATION:  CT ABDOMEN AND PELVIS WITH IV CONTRAST    CLINICAL INFORMATION: perineal pain      COMPARISON: None.    PROCEDURE:   CT of the Abdomen and Pelvis was performed with intravenous contrast.  Intravenous contrast: 90 cc Omnipaque 350  Oral contrast: None.  Sagittal and coronal reformats were performed.    FINDINGS:    LOWER CHEST: Clear.    LIVER: Normal.  BILE DUCTS: Nondilated.  GALLBLADDER: Normal.  SPLEEN: Normal.  PANCREAS: Normal.  ADRENALS: Normal.  KIDNEYS/URETERS: No hydronephrosis or urinary tract calculi. 2.1 cm   indeterminate left lower pole renal lesion.    BLADDER: Normal.  REPRODUCTIVE ORGANS: Unremarkable prostate and seminal vesicles.    BOWEL: No bowel-related abnormality. Specifically, no evidence of acute   diverticulitis. Normal ileocecal region. No bowel obstruction or bowel   inflammation.  PERITONEUM: No free air or ascites.  VESSELS:  Normal caliber aorta.  RETROPERITONEUM/LYMPH NODES: No adenopathy.    ABDOMINAL WALL: Normal.  BONES: No acute bony abnormality.    IMPRESSION:     No evidence of perianal or perineal inflammation or abscess.    2.1 cm indeterminate left lower pole renal lesion. Follow-up MRI abdomen   with and without contrast.    < end of copied text >

## 2019-11-07 NOTE — DISCHARGE NOTE PROVIDER - NSDCMRMEDTOKEN_GEN_ALL_CORE_FT
chondroitin:   diclofenac sodium 75 mg oral delayed release tablet: 1 tab(s) orally prn  levoFLOXacin 500 mg oral tablet: 1 tab(s) orally once a day

## 2019-11-07 NOTE — CONSULT NOTE ADULT - ASSESSMENT
59 year old male with perineal pain which is intermittent and has resolved on its own. Physical exam of the rectal and perineum is normal. Radiology images reviewed and there is no clinical evidence of a fistula. His symptoms are likely musculoskeletal in nature.

## 2019-11-07 NOTE — DISCHARGE NOTE PROVIDER - PROVIDER TOKENS
PROVIDER:[TOKEN:[[id number]],FOLLOWUP:[1 week]],PROVIDER:[TOKEN:[[id number]],FOLLOWUP:[1 week]]

## 2019-11-07 NOTE — DISCHARGE NOTE PROVIDER - CARE PROVIDER_API CALL
Nilam Hardy)  Family Medicine  [address] [place] [address]  Phone: [phone]  Fax: [phone]  Follow Up Time: 1 week    Solitario Horner)  Urology  [address] [facility], [address] [place] [address]  Phone: [phone]  Fax: [phone]  Follow Up Time: 1 week

## 2019-11-07 NOTE — DISCHARGE NOTE PROVIDER - HOSPITAL COURSE
CC: Hoda-anal pain        Subjective and Objective:     58 y/o M with PMH of bartonella infection 3 years ago, p/w groin pain. Patient states he has had groin pain for about two months, but is mainly pointing at the perineum area. Also states he has some pain in inguinal areas and lower abdomen. Yesterday afternoon pain became so severe that patient was not able to walk, he had to crawl on the floor inside his house. He wasn't even able to make it to bed, he ended up sleeping on the couch. Normally he is a very active person, and now he is barely able to do anything. Patient states multiple times that pain is not coming from testicles, and does not believe that this could any way be referred pain from epididymitis seen on U/S. Patient is demanding further evaluation with MRI, as he states he is in severe pain that hasn't been explained with CT scan or U/S, and it is causing difficulty with ambulation. Patient denies fever, chills, dysuria, nausea, vomiting, CP, cough, runny nose, sore throat. Patient denies any weakness in the lower extremities, and states difficulty walking is purely due to the pain that he is feeling. Denies any sensory deficits / saddle anesthesia. Denies back pain, incontinence.         11/06/19: Patient seen and examined. NO new complaints. Waiting for MRI.     11/7: Pt very eager to be discharged, threatening to leave AMA.  Pt told to stay for further ID evaluation however pt wishes to go home.  He is HD stable, afebrile.  I will discharge him on a course of antibiotics for possible epididymitis and close outpatient follow up.  MR showing healing anal tact, no abscess, or fluid.        REVIEW OF SYSTEMS: All other review of systems is negative unless indicated above.        Vital Signs Last 24 Hrs    T(C): 36.7 (07 Nov 2019 05:14), Max: 37.1 (06 Nov 2019 11:36)    T(F): 98 (07 Nov 2019 05:14), Max: 98.8 (06 Nov 2019 11:36)    HR: 70 (07 Nov 2019 05:14) (70 - 81)    BP: 113/71 (07 Nov 2019 05:14) (113/71 - 119/77)    BP(mean): --    RR: 17 (07 Nov 2019 05:14) (16 - 18)    SpO2: 96% (07 Nov 2019 05:14) (96% - 99%)        PHYSICAL EXAM:        Constitutional: NAD, awake and alert, well-developed    HEENT: PERR, EOMI, Normal Hearing, MMM    Neck: Soft and supple    Respiratory: Breath sounds are clear bilaterally, No wheezing, rales or rhonchi    Cardiovascular: S1 and S2, regular rate and rhythm, no Murmurs, gallops or rubs    Gastrointestinal: Bowel Sounds present, soft, nontender, nondistended, no guarding, no rebound    Extremities: No peripheral edema    Neurological: A/O x 3, no focal deficits in my limited exam    Skin: No rashes        meds/labs: Reviewed        Assessment and Plan:  58 y/o M with PMH of bartonella infection 3 years ago, p/w perineal /groin pain        *Epididymitis: IV Ceftriaxone with transition to oral abx for 10 days total as pt very eager to be discharged.    -UA negative and Ucx neg    -Uro eval appreciated.  follow up 1-2 weeks in office.        *Perineal / Groin / lower abdominal pain    -MRI of pelvic area and lumbar spine for further evaluation-pending    -Pain control        *2.1cm indeterminate renal lesion:  Seen on CT but not on MR, Follow up with outpatient urologist and pcp.        Attending Statement: 40 minutes spent on total encounter and discharge planning. CC: Hoda-anal pain        Subjective and Objective:     58 y/o M with PMH of bartonella infection 3 years ago, p/w groin pain. Patient states he has had groin pain for about two months, but is mainly pointing at the perineum area. Also states he has some pain in inguinal areas and lower abdomen. Yesterday afternoon pain became so severe that patient was not able to walk, he had to crawl on the floor inside his house. He wasn't even able to make it to bed, he ended up sleeping on the couch. Normally he is a very active person, and now he is barely able to do anything. Patient states multiple times that pain is not coming from testicles, and does not believe that this could any way be referred pain from epididymitis seen on U/S. Patient is demanding further evaluation with MRI, as he states he is in severe pain that hasn't been explained with CT scan or U/S, and it is causing difficulty with ambulation. Patient denies fever, chills, dysuria, nausea, vomiting, CP, cough, runny nose, sore throat. Patient denies any weakness in the lower extremities, and states difficulty walking is purely due to the pain that he is feeling. Denies any sensory deficits / saddle anesthesia. Denies back pain, incontinence.         11/06/19: Patient seen and examined. NO new complaints. Waiting for MRI.     11/7: Pt very eager to be discharged, threatening to leave AMA.  Pt told to stay for further ID evaluation however pt wishes to go home.  He is HD stable, afebrile.  I will discharge him on a course of antibiotics for possible epididymitis and close outpatient follow up.  MR showing healing anal tact, no abscess, or fluid.  Seen by colorectal surgeon, do not believe pain related to this and no intervention indicated.         REVIEW OF SYSTEMS: All other review of systems is negative unless indicated above.        Vital Signs Last 24 Hrs    T(C): 36.7 (07 Nov 2019 05:14), Max: 37.1 (06 Nov 2019 11:36)    T(F): 98 (07 Nov 2019 05:14), Max: 98.8 (06 Nov 2019 11:36)    HR: 70 (07 Nov 2019 05:14) (70 - 81)    BP: 113/71 (07 Nov 2019 05:14) (113/71 - 119/77)    BP(mean): --    RR: 17 (07 Nov 2019 05:14) (16 - 18)    SpO2: 96% (07 Nov 2019 05:14) (96% - 99%)        PHYSICAL EXAM:        Constitutional: NAD, awake and alert, well-developed    HEENT: PERR, EOMI, Normal Hearing, MMM    Neck: Soft and supple    Respiratory: Breath sounds are clear bilaterally, No wheezing, rales or rhonchi    Cardiovascular: S1 and S2, regular rate and rhythm, no Murmurs, gallops or rubs    Gastrointestinal: Bowel Sounds present, soft, nontender, nondistended, no guarding, no rebound    Extremities: No peripheral edema    Neurological: A/O x 3, no focal deficits in my limited exam    Skin: No rashes        meds/labs: Reviewed        Assessment and Plan:  58 y/o M with PMH of bartonella infection 3 years ago, p/w perineal /groin pain        *Epididymitis: IV Ceftriaxone with transition to oral abx for 10 days total as pt very eager to be discharged.    -UA negative and Ucx neg    -Uro eval appreciated.  follow up 1-2 weeks in office.        *Perineal / Groin / lower abdominal pain    -MRI of pelvic area and lumbar spine for further evaluation shows a healing perianal tract.  Surgery evaluated pt and not concerned with findings, no need for further intervention at this time.    -Pain control        *2.1cm indeterminate renal lesion:  Seen on CT.  Pt aware of this findings for 20 years.  Indicated to him to follow up for this and should get MRI.        Attending Statement: 40 minutes spent on total encounter and discharge planning.

## 2019-11-07 NOTE — DISCHARGE NOTE PROVIDER - NSDCCPCAREPLAN_GEN_ALL_CORE_FT
PRINCIPAL DISCHARGE DIAGNOSIS  Diagnosis: Epididymitis  Assessment and Plan of Treatment: continue antibiotics and complete course.  You must follow up with urologist 1 - 2 weeks.  MR shows healing anal tract, no abscess or fluid collection. Follow up with PCP 1-2 weeks.  2.1cm left renal lesion not seen on MR PRINCIPAL DISCHARGE DIAGNOSIS  Diagnosis: Epididymitis  Assessment and Plan of Treatment: continue antibiotics and complete course.  You must follow up with urologist 1 - 2 weeks.  MR shows healing anal tract, no abscess or fluid collection. Colorectal surgery cleared you, no further intervention indicated.  Follow up with PCP 1-2 weeks.  2.1cm left renal lesion would recommend non-urgent outpatient MRI of kidneys.

## 2019-11-11 DIAGNOSIS — R10.2 PELVIC AND PERINEAL PAIN: ICD-10-CM

## 2019-11-11 DIAGNOSIS — R10.30 LOWER ABDOMINAL PAIN, UNSPECIFIED: ICD-10-CM

## 2019-11-11 DIAGNOSIS — N28.9 DISORDER OF KIDNEY AND URETER, UNSPECIFIED: ICD-10-CM

## 2019-11-11 DIAGNOSIS — N45.1 EPIDIDYMITIS: ICD-10-CM

## 2023-07-25 NOTE — ED STATDOCS - PROGRESS NOTE ADDITIONAL2
4.2 2.8 - 4.5 g/dL    Albumin/Globulin Ratio 0.9 0.4 - 1.6     Protime-INR   Result Value Ref Range    Protime 12.7 12.6 - 14.3 sec    INR 1.0     TYPE AND SCREEN   Result Value Ref Range    Crossmatch expiration date 07/27/2023,2359     ABO/Rh O POSITIVE     Antibody Screen NEG         CT ABDOMEN PELVIS W IV CONTRAST Additional Contrast? None   Final Result         1. Query mild distal esophageal wall thickening. Correlate with signs and    symptoms of esophagitis. 2.  No bowel obstruction. 3.  Splenomegaly. Thank you for the referral of this patient. This exam was interpreted by an    [employer] Board [employer] certified radiologist with subspecialty fellowship    in [address] [place] [address]. If there are any questions regarding this exam please call us    at [phone]. Jeffry Thomas M.D.    7/24/2023 9:34:00 PM                        Voice dictation software was used during the making of this note. This software is not perfect and grammatical and other typographical errors may be present. This note has not been completely proofread for errors.      Tita Hart  07/24/23 [id number]
Additional Progress Note...